# Patient Record
Sex: FEMALE | Race: BLACK OR AFRICAN AMERICAN | Employment: OTHER | URBAN - METROPOLITAN AREA
[De-identification: names, ages, dates, MRNs, and addresses within clinical notes are randomized per-mention and may not be internally consistent; named-entity substitution may affect disease eponyms.]

---

## 2020-09-19 ENCOUNTER — HOSPITAL ENCOUNTER (EMERGENCY)
Age: 65
Discharge: HOME | End: 2020-09-19
Attending: EMERGENCY MEDICINE
Payer: MEDICARE

## 2020-09-19 ENCOUNTER — APPOINTMENT (OUTPATIENT)
Dept: GENERAL RADIOLOGY | Age: 65
End: 2020-09-19
Attending: EMERGENCY MEDICINE
Payer: MEDICARE

## 2020-09-19 VITALS
TEMPERATURE: 99.4 F | DIASTOLIC BLOOD PRESSURE: 79 MMHG | RESPIRATION RATE: 17 BRPM | HEART RATE: 76 BPM | HEIGHT: 64 IN | OXYGEN SATURATION: 98 % | WEIGHT: 166.67 LBS | SYSTOLIC BLOOD PRESSURE: 143 MMHG | BODY MASS INDEX: 28.45 KG/M2

## 2020-09-19 DIAGNOSIS — M54.16 LUMBAR RADICULOPATHY: Primary | ICD-10-CM

## 2020-09-19 DIAGNOSIS — M54.31 SCIATICA OF RIGHT SIDE: ICD-10-CM

## 2020-09-19 PROCEDURE — 73502 X-RAY EXAM HIP UNI 2-3 VIEWS: CPT

## 2020-09-19 PROCEDURE — 96372 THER/PROPH/DIAG INJ SC/IM: CPT

## 2020-09-19 PROCEDURE — 99283 EMERGENCY DEPT VISIT LOW MDM: CPT

## 2020-09-19 PROCEDURE — 74011250637 HC RX REV CODE- 250/637: Performed by: EMERGENCY MEDICINE

## 2020-09-19 PROCEDURE — 74011250636 HC RX REV CODE- 250/636: Performed by: EMERGENCY MEDICINE

## 2020-09-19 RX ORDER — KETOROLAC TROMETHAMINE 30 MG/ML
15 INJECTION, SOLUTION INTRAMUSCULAR; INTRAVENOUS
Status: COMPLETED | OUTPATIENT
Start: 2020-09-19 | End: 2020-09-19

## 2020-09-19 RX ORDER — MORPHINE SULFATE 2 MG/ML
4 INJECTION, SOLUTION INTRAMUSCULAR; INTRAVENOUS ONCE
Status: COMPLETED | OUTPATIENT
Start: 2020-09-19 | End: 2020-09-19

## 2020-09-19 RX ORDER — LIDOCAINE 4 G/100G
1 PATCH TOPICAL EVERY 12 HOURS
Qty: 60 PATCH | Refills: 0 | Status: SHIPPED | OUTPATIENT
Start: 2020-09-19 | End: 2020-10-19

## 2020-09-19 RX ORDER — METHOCARBAMOL 750 MG/1
750 TABLET, FILM COATED ORAL 4 TIMES DAILY
Qty: 56 TAB | Refills: 0 | Status: SHIPPED | OUTPATIENT
Start: 2020-09-19 | End: 2020-10-03

## 2020-09-19 RX ORDER — ACETAMINOPHEN 500 MG
1000 TABLET ORAL ONCE
Status: DISCONTINUED | OUTPATIENT
Start: 2020-09-19 | End: 2020-09-19 | Stop reason: SDUPTHER

## 2020-09-19 RX ORDER — ACETAMINOPHEN 500 MG
1000 TABLET ORAL ONCE
Status: COMPLETED | OUTPATIENT
Start: 2020-09-19 | End: 2020-09-19

## 2020-09-19 RX ORDER — IBUPROFEN 400 MG/1
800 TABLET ORAL ONCE
Status: COMPLETED | OUTPATIENT
Start: 2020-09-19 | End: 2020-09-19

## 2020-09-19 RX ORDER — IBUPROFEN 400 MG/1
800 TABLET ORAL ONCE
Status: DISCONTINUED | OUTPATIENT
Start: 2020-09-19 | End: 2020-09-19 | Stop reason: SDUPTHER

## 2020-09-19 RX ORDER — DIAZEPAM 5 MG/1
5 TABLET ORAL
Status: COMPLETED | OUTPATIENT
Start: 2020-09-19 | End: 2020-09-19

## 2020-09-19 RX ADMIN — IBUPROFEN 800 MG: 400 TABLET, FILM COATED ORAL at 02:55

## 2020-09-19 RX ADMIN — KETOROLAC TROMETHAMINE 15 MG: 30 INJECTION, SOLUTION INTRAMUSCULAR at 04:26

## 2020-09-19 RX ADMIN — ACETAMINOPHEN 1000 MG: 500 TABLET ORAL at 02:55

## 2020-09-19 RX ADMIN — DIAZEPAM 5 MG: 5 TABLET ORAL at 03:16

## 2020-09-19 RX ADMIN — MORPHINE SULFATE 4 MG: 2 INJECTION, SOLUTION INTRAMUSCULAR; INTRAVENOUS at 04:26

## 2020-09-19 NOTE — ED NOTES
Chano Ambriz MD reviewed discharge instructions with the patient. The patient verbalized understanding. All questions and concerns were addressed. The patient is discharged ambulatory with instructions and prescriptions in hand. Pt is alert and oriented x 4. Respirations are clear and unlabored.

## 2020-09-19 NOTE — DISCHARGE INSTRUCTIONS
PAIN CONTROL  Tylenol 1000 mg every 6 hours  Ibuprofen 600mg every 6 hours  Robaxin 750 mg every 6 hours as needed for breakthrough pain  Lidocaine Patch 4% (Over the counter - called Salonpas)  Heat, ice, massage

## 2020-09-19 NOTE — ED PROVIDER NOTES
EMERGENCY DEPARTMENT HISTORY AND PHYSICAL EXAM      Date: 9/19/2020  Patient Name: Lucas Hendricks    History of Presenting Illness     Chief Complaint   Patient presents with    Hip Pain     Pt having right hip pain- pt states it is very stiff and painful that started today. Pt denies any injury/trauma. History Provided By: Patient    HPI: Lucas Hendricks, 72 y.o. female  With past medical history of herniated disc presenting today with right-sided hip and back pain. The patient states that she was outside doing some gardening more. She notes that she was digging holes, planting some plants, and then had sudden onset of severe right-sided hip pain. She notes that it is in the lateral hip on the right side, and she has radiation down into her posterior thigh. She denies any weakness, numbness, tingling. She states that she has had a herniated disc in the past.  She has had to do physical therapy as well. She did not fall or strike her head. She states that the pain got worse this evening and she brought herself to the hospital to be evaluated. No bowel bladder dysfunction. No other complaints at this time. There are no other complaints, changes, or physical findings at this time. PCP: Marito Ingram MD    No current facility-administered medications on file prior to encounter. Current Outpatient Medications on File Prior to Encounter   Medication Sig Dispense Refill    nitroglycerin (NITROSTAT) 0.4 mg SL tablet Take 1 Tab by mouth every five (5) minutes as needed for Chest Pain. Sit/Lay down then put one tab under the tongue every 5 minutes as needed for chest pain for 3 doses 1 Bottle 0    traMADol (ULTRAM) 50 mg tablet Take 1 Tab by mouth every six (6) hours as needed for Pain. 20 Tab 0    KELP PO Take  by mouth.  multivits,Stress Formula-Zinc tablet Take 1 Tab by mouth daily.  multivitamin (ONE A DAY) tablet Take 1 Tab by mouth daily.       biotin 2,500 mcg tab Take  by mouth.      cyanocobalamin (VITAMIN B-12) 500 mcg tablet Take 500 mcg by mouth daily. Past History     Past Medical History:  No past medical history on file. Past Surgical History:  Past Surgical History:   Procedure Laterality Date    HX GYN      hysterectomy       Family History:  No family history on file. Social History:  Social History     Tobacco Use    Smoking status: Never Smoker   Substance Use Topics    Alcohol use: No    Drug use: No       Allergies:  No Known Allergies      Review of Systems   Constitutional: No  fever  Skin: No  rash  HEENT: No  nasal congestion  Resp: No cough  CV: No chest pain  GI: No vomiting  : No dysuria  MSK: + joint pain  Neuro: No numbness  Psych: No suicidal      Physical Exam     Patient Vitals for the past 12 hrs:   Temp Pulse Resp BP SpO2   09/19/20 0212 99.4 °F (37.4 °C) 76 17 (!) 143/79 98 %       General: alert, No acute distress  Eyes: EOMI, normal conjunctiva  ENT: moist mucous membranes. Neck: Active, full ROM of neck. Skin: No rashes. no jaundice              Lungs: Equal chest expansion. no respiratory distress. Heart: regular rate     no peripheral edema    Abd:  non distended soft  Back: Full ROM, no tenderness palpation in the midline of the back. MSK: Tenderness to palpation in the lateral right hip with area of muscle spasm approximately 2 cm to centimeter. Normal range of motion in this hip. Neuro: alert  Person, Place, Time and Situation; normal speech;   Psych: Cooperative with exam; Appropriate mood and affect             Diagnostic Study Results     Labs -   No results found for this or any previous visit (from the past 12 hour(s)). Radiologic Studies -   XR HIP RT W OR WO PELV 2-3 VWS   Final Result   IMPRESSION:    No acute abnormality. CT Results  (Last 48 hours)    None        CXR Results  (Last 48 hours)    None          Medical Decision Making   I am the first provider for this patient.     I reviewed the vital signs, available nursing notes, past medical history, past surgical history, family history and social history. Vital Signs-Reviewed the patient's vital signs. Patient Vitals for the past 12 hrs:   Temp Pulse Resp BP SpO2   09/19/20 0212 99.4 °F (37.4 °C) 76 17 (!) 143/79 98 %       Pulse Oximetry Analysis - 98% on room air      Provider Notes (Medical Decision Making):     Differential Diagnosis: Herniated disc, muscle spasm, sciatica, lumbar radiculopathy, fracture    Initial Plan: We will treat the patient with oral analgesics she is Miguel using topical agents at home. Plain film, and reassess. ED Course:   Initial assessment performed. The patients presenting problems have been discussed, and they are in agreement with the care plan formulated and outlined with them. I have encouraged them to ask questions as they arise throughout their visit. ED Course as of Sep 19 0531   Sat Sep 19, 2020   0257 On my interpretation of the patient's hip x-ray there is no evidence of acute fracture. [NW]   2913 Patient presenting today with left-sided hip pain most consistent with sciatica lumbar radiculopathy. I treated her with oral analgesics and she also received IM Toradol and IM morphine. Her pain was improved after this. Will discharge with oral and topical analgesics and have her follow-up with her primary care physician.    [NW]      ED Course User Index  [NW] Leroy Guadarrama MD       I, Cammie Witt MD, am the attending of record for this patient encounter. Dispo: Discharged. The patient has been re-evaluated and is ready for discharge. Reviewed available results with patient. Counseled patient on diagnosis and care plan. Patient has expressed understanding, and all questions have been answered. Patient agrees with plan and agrees to follow up as recommended, or to return to the ED if their symptoms worsen.  Discharge instructions have been provided and explained to the patient, along with reasons to return to the ED. PLAN:  Current Discharge Medication List      START taking these medications    Details   methocarbamoL (Robaxin-750) 750 mg tablet Take 1 Tab by mouth four (4) times daily for 14 days. Qty: 56 Tab, Refills: 0      lidocaine 4 % patch 1 Patch by TransDERmal route every twelve (12) hours every twelve (12) hours for 30 days. Qty: 60 Patch, Refills: 0           2. Follow-up Information     Follow up With Specialties Details Why Contact Info    Martha Davis MD [address]  [phone]          3. Return to ED if worse       Diagnosis     Clinical Impression:   1. Lumbar radiculopathy    2. Sciatica of right side        Attestations:    Alisa Valdes MD    Please note that this dictation was completed with [employer], the computer voice recognition software. Quite often unanticipated grammatical, syntax, homophones, and other interpretive errors are inadvertently transcribed by the computer software. Please disregard these errors. Please excuse any errors that have escaped final proofreading. Thank you.

## 2023-05-11 RX ORDER — NITROGLYCERIN 0.4 MG/1
TABLET SUBLINGUAL
COMMUNITY
Start: 2014-02-02

## 2023-05-11 RX ORDER — TRAMADOL HYDROCHLORIDE 50 MG/1
TABLET ORAL EVERY 6 HOURS PRN
COMMUNITY
Start: 2014-02-02

## 2024-06-27 ENCOUNTER — HOSPITAL ENCOUNTER (OUTPATIENT)
Facility: HOSPITAL | Age: 69
Setting detail: RECURRING SERIES
Discharge: HOME | End: 2024-06-30
Payer: MEDICARE

## 2024-06-27 PROCEDURE — 97161 PT EVAL LOW COMPLEX 20 MIN: CPT

## 2024-06-27 PROCEDURE — 97110 THERAPEUTIC EXERCISES: CPT

## 2024-06-27 NOTE — THERAPY EVALUATION
[facility]  [address]  [place] [address]  Phone: [phone]   Fax: [phone]          PHYSICAL THERAPY - MEDICARE EVALUATION/PLAN OF CARE NOTE (updated 3/23)      Date: 2024          Patient Name:  Beverly Arredondo :  1955   Medical   Diagnosis:  Other low back pain [M54.59]  Radiculopathy, lumbar region [M54.16] Treatment Diagnosis:  M54.41  LUMBAGO WITH SCIATICA, RIGHT SIDE and M54.59, G89.29  CHRONIC LOWER BACK PAIN    Referral Source:  Lorenzo Connell MD Provider #:  [id number]                Insurance: Payor: MEDICARE / Plan: MEDICARE PART A AND B / Product Type: *No Product type* /      Patient  verified yes     Visit #   Current  / Total 1 24   Time   In / Out 8:17 am 9:12 am   Total Treatment Time 55 min   Total Timed Codes 23 min   1:1 Treatment Time 23 min      [facility] Totals Reminder:  bill using total billable   min of TIMED therapeutic procedures and modalities.   8-22 min = 1 unit; 23-37 min = 2 units; 38-52 min = 3 units;  53-67 min = 4 units; 68-82 min = 5 units       SUBJECTIVE  Pain Level (0-10 scale): 0/10  []constant []intermittent [x]improving []worsening []no change since onset    Any medication changes, allergies to medications, adverse drug reactions, diagnosis change, or new procedure performed?: [x] No    [] Yes (see summary sheet for update)  Medications: Verified on Patient Summary List    Subjective functional status/changes:     Patient presents to skilled physical therapy with chief complaint of stiffness and pain across lower back and bilateral hips with radiating pain down right leg. Patient reports she has had several episodes of exacerbation of pain and stiffness in the past and it commonly switches between right and left leg. Most recent exacerbation in pain was ~2 months ago when she first noted a \"twinge\" when cutting the grass. Reports after this she became very

## 2024-07-12 ENCOUNTER — APPOINTMENT (OUTPATIENT)
Facility: HOSPITAL | Age: 69
End: 2024-07-12
Payer: COMMERCIAL

## 2024-07-16 ENCOUNTER — HOSPITAL ENCOUNTER (OUTPATIENT)
Facility: HOSPITAL | Age: 69
Setting detail: RECURRING SERIES
Discharge: HOME | End: 2024-07-19
Attending: FAMILY MEDICINE
Payer: MEDICARE

## 2024-07-16 PROCEDURE — 97110 THERAPEUTIC EXERCISES: CPT

## 2024-07-16 NOTE — PROGRESS NOTES
PHYSICAL THERAPY - MEDICARE DAILY TREATMENT NOTE (updated 3/23)      Date: 2024          Patient Name:  Beverly Arredondo :  1955   Medical   Diagnosis:  Other low back pain [M54.59]  Radiculopathy, lumbar region [M54.16] Treatment Diagnosis:  M54.41  LUMBAGO WITH SCIATICA, RIGHT SIDE and M54.59, G89.29  CHRONIC LOWER BACK PAIN    Referral Source:  Lorenzo Connell MD Insurance:   Payor: MEDICARE / Plan: MEDICARE PART A AND B / Product Type: *No Product type* /                     Patient  verified yes     Visit #   Current  / Total 2 24   Time   In / Out 701 748   Total Treatment Time 47   Total Timed Codes 47   1:1 Treatment Time 47      [facility] Totals Reminder:  bill using total billable   min of TIMED therapeutic procedures and modalities.   8-22 min = 1 unit; 23-37 min = 2 units; 38-52 min = 3 units; 53-67 min = 4 units; 68-82 min = 5 units            SUBJECTIVE    Pain Level (0-10 scale): 0/10    Any medication changes, allergies to medications, adverse drug reactions, diagnosis change, or new procedure performed?: [x] No    [] Yes (see summary sheet for update)  Medications: Verified on Patient Summary List    Subjective functional status/changes:     Patient noted they have had COVID recently and were very sick so they weren't doing too many of their exercises, but have begun doing them recently. Patient continues to note stiffness but notes decreased radicular symptoms.     OBJECTIVE      Therapeutic Procedures:  Tx Min Billable or 1:1 Min (if diff from Tx Min) Procedure, Rationale, Specifics   47  [id number] Therapeutic Exercise (timed):  increase ROM, strength, coordination, balance, and proprioception to improve patient's ability to progress to PLOF and address remaining functional goals. (see flow sheet as applicable)     Details if applicable:                         47     Total Total             [x]  Patient Education billed concurrently with other procedures   [x] Review HEP    []

## 2024-07-19 ENCOUNTER — HOSPITAL ENCOUNTER (OUTPATIENT)
Facility: HOSPITAL | Age: 69
Setting detail: RECURRING SERIES
Discharge: HOME | End: 2024-07-22
Attending: FAMILY MEDICINE
Payer: COMMERCIAL

## 2024-07-19 PROCEDURE — 97110 THERAPEUTIC EXERCISES: CPT

## 2024-07-19 NOTE — PROGRESS NOTES
PHYSICAL THERAPY - MEDICARE DAILY TREATMENT NOTE (updated 3/23)      Date: 2024          Patient Name:  Beverly Arredondo :  1955   Medical   Diagnosis:  Other low back pain [M54.59]  Radiculopathy, lumbar region [M54.16] Treatment Diagnosis:  M54.41  LUMBAGO WITH SCIATICA, RIGHT SIDE and M54.59, G89.29  CHRONIC LOWER BACK PAIN    Referral Source:  Lorenzo Connell MD Insurance:   Payor: MEDICARE / Plan: MEDICARE PART A AND B / Product Type: *No Product type* /                     Patient  verified yes     Visit #   Current  / Total 3 24   Time   In / Out 7:03 am 7:59 am   Total Treatment Time 56 min   Total Timed Codes 56 min   1:1 Treatment Time 56 min      [facility] Totals Reminder:  bill using total billable   min of TIMED therapeutic procedures and modalities.   8-22 min = 1 unit; 23-37 min = 2 units; 38-52 min = 3 units; 53-67 min = 4 units; 68-82 min = 5 units        SUBJECTIVE    Pain Level (0-10 scale): 0/10    Any medication changes, allergies to medications, adverse drug reactions, diagnosis change, or new procedure performed?: [x] No    [] Yes (see summary sheet for update)  Medications: Verified on Patient Summary List    Subjective functional status/changes:     Patient reports minimal to no pain over the past several days, but does note continued stiffness across entire lower back and most specifically along L posterolateral hip. Reports she has continued to do her exercises, but has been focusing on the initial set of stretches/exercises more due to having to go outside to use the stairs.    OBJECTIVE    Therapeutic Procedures:  Tx Min Billable or 1:1 Min (if diff from Tx Min) Procedure, Rationale, Specifics   56  15844 Therapeutic Exercise (timed):  increase ROM, strength, coordination, balance, and proprioception to improve patient's ability to progress to PLOF and address remaining functional goals. (see flow sheet as applicable)     Details if applicable:                         56

## 2024-07-25 ENCOUNTER — HOSPITAL ENCOUNTER (OUTPATIENT)
Facility: HOSPITAL | Age: 69
Setting detail: RECURRING SERIES
Discharge: HOME | End: 2024-07-28
Attending: FAMILY MEDICINE
Payer: COMMERCIAL

## 2024-07-25 PROCEDURE — 97110 THERAPEUTIC EXERCISES: CPT

## 2024-07-25 NOTE — PROGRESS NOTES
life and sleep hygiene.  Patient will report no pain/stiffness when performing sit to stand transfers from standard seat height or toilet without use of UE to show improving functional mobility needed for all transitional movements.  Patient will report reduced familiar symptoms by >/= 80% allowing for improving participation in all household chores and yard work such as mowing the yard without onset of pain.         PLAN  Yes  Continue plan of care  Re-Cert Due: 9/25/2024  [x]  Upgrade activities as tolerated  []  Discharge due to:  []  Other:      Riccardo Puckett PTA       7/25/2024       7:34 AM

## 2024-08-01 ENCOUNTER — HOSPITAL ENCOUNTER (OUTPATIENT)
Facility: HOSPITAL | Age: 69
Setting detail: RECURRING SERIES
Discharge: HOME | End: 2024-08-04
Attending: FAMILY MEDICINE
Payer: MEDICARE

## 2024-08-01 PROCEDURE — 97110 THERAPEUTIC EXERCISES: CPT

## 2024-08-02 NOTE — PROGRESS NOTES
PHYSICAL THERAPY - MEDICARE DAILY TREATMENT NOTE (updated 3/23)      Date: 2024          Patient Name:  Beverly Arredondo :  1955   Medical   Diagnosis:  Other low back pain [M54.59]  Radiculopathy, lumbar region [M54.16] Treatment Diagnosis:  M54.41  LUMBAGO WITH SCIATICA, RIGHT SIDE and M54.59, G89.29  CHRONIC LOWER BACK PAIN    Referral Source:  Lorenzo Connell MD Insurance:   Payor: MEDICARE / Plan: MEDICARE PART A AND B / Product Type: *No Product type* /                     Patient  verified yes     Visit #   Current  / Total 5 24   Time   In / Out 7:04 am 7:58 am   Total Treatment Time 54 min   Total Timed Codes 54 min   1:1 Treatment Time 47 min      [facility] Totals Reminder:  bill using total billable   min of TIMED therapeutic procedures and modalities.   8-22 min = 1 unit; 23-37 min = 2 units; 38-52 min = 3 units; 53-67 min = 4 units; 68-82 min = 5 units        SUBJECTIVE    Pain Level (0-10 scale): 0/10    Any medication changes, allergies to medications, adverse drug reactions, diagnosis change, or new procedure performed?: [x] No    [] Yes (see summary sheet for update)  Medications: Verified on Patient Summary List    Subjective functional status/changes:     Patient reports she has continued to note minimal to no radicular sx down right leg, but still experiencing stiffness and pain across entire lower back. Reports her left hip has started to become more bothersome than the right. Reports continued difficulty with transitional movements such as sit to stands and especially with bending related tasks. Reports she has to really be careful and think about her body position prior to bending down to pick something up off the ground or stooping down for a task. Patient feels she has improved by 50% since starting PT.    OBJECTIVE    Therapeutic Procedures:  Tx Min Billable or 1:1 Min (if diff from Tx Min) Procedure, Rationale, Specifics   54 [phone] [id number] Therapeutic Exercise (timed):  increase 
to benefit from skilled PT / OT services to modify and progress therapeutic interventions, analyze and address functional mobility deficits, analyze and address ROM deficits, analyze and address strength deficits, analyze and address soft tissue restrictions, analyze and cue for proper movement patterns, and analyze and modify for postural abnormalities to address functional deficits and attain remaining goals.     CURRENT STATUS/GOALS:  Short Term Goals: To be accomplished in 12 treatments.  Patient will demonstrate understanding of and compliance with HEP showing full participation in physical therapy. Met   Patient will perform >/= 6 repetitions of STS without UE assist from standard seat height during 30 second STS test to show improving ease with transfers in household. Met  Patient will demonstrate pain-free thoracolumbar AROM all directions without report of pain to allow improving ease with tasks such as bending over to put on shoes or twisting to turn over in bed. Met  Patient will report reduced familiar symptoms by >/= 25% allowing for improving participation in daily tasks. Met (Subjective report: 50%)     Long Term Goals: To be accomplished in 24 treatments.  Patient will not decrease FOTO score by more than the MDC of 9 to >/= 73 showing continued high level of self-reported level of function.  Met (75/100)  Patient will report decrease stiffness and pains in the mornings getting out of bed with pain no greater than 2/10 to allow improving overall quality of life and sleep hygiene. Progressing  Patient will report no pain/stiffness when performing sit to stand transfers from standard seat height or toilet without use of UE to show improving functional mobility needed for all transitional movements. Progressing  Patient will report reduced familiar symptoms by >/= 80% allowing for improving participation in all household chores and yard work such as mowing the yard without onset of pain.

## 2024-08-06 ENCOUNTER — HOSPITAL ENCOUNTER (OUTPATIENT)
Facility: HOSPITAL | Age: 69
Setting detail: RECURRING SERIES
Discharge: HOME | End: 2024-08-09
Attending: FAMILY MEDICINE
Payer: MEDICARE

## 2024-08-06 PROCEDURE — 97110 THERAPEUTIC EXERCISES: CPT

## 2024-08-06 PROCEDURE — 97140 MANUAL THERAPY 1/> REGIONS: CPT

## 2024-08-06 NOTE — PROGRESS NOTES
to PLOF and address remaining functional goals. (see flow sheet as applicable)     Details if applicable:     12 12 51706 Manual Therapy (timed):  decrease pain, increase ROM, increase tissue extensibility, decrease edema, decrease trigger points, and increase postural awareness to improve patient's ability to progress to PLOF and address remaining functional goals.  The manual therapy interventions were performed at a separate and distinct time from the therapeutic activities interventions . (see flow sheet as applicable)    Details if applicable:   right sidelying: STM/TPR L posterolateral hip (glute min/med/max/piriformis/QL); gentle sidelying lumbar/SI gapping technique; L hip extension PROM with gentle PA joint mob                  54 54    Total Total       [x]  Patient Education billed concurrently with other procedures   [x] Review HEP    [] Progressed/Changed HEP, detail:    [] Other detail:         Other Objective/Functional Measures    Pain Level at end of session (0-10 scale): 6/10      Assessment   Patient tolerated treatment session fair today, but note significant TTP along L posterolateral hip and moderate flexibility restriction through L hip flexors during manual therapy. Recommended patient increase frequency of performing stretches after period of inactivity due to report of continued stiffness at night and first thing in the mornings. Patient verbalized understanding and will initiate new plan at home to improve sx management. Continue to progress as appropriate. Patient will continue to benefit from skilled PT / OT services to modify and progress therapeutic interventions, analyze and address functional mobility deficits, analyze and address ROM deficits, analyze and address strength deficits, analyze and address soft tissue restrictions, analyze and cue for proper movement patterns, and analyze and modify for postural abnormalities to address functional deficits and attain remaining

## 2024-08-09 ENCOUNTER — APPOINTMENT (OUTPATIENT)
Facility: HOSPITAL | Age: 69
End: 2024-08-09
Payer: MEDICARE

## 2024-08-13 ENCOUNTER — HOSPITAL ENCOUNTER (OUTPATIENT)
Facility: HOSPITAL | Age: 69
Setting detail: RECURRING SERIES
Discharge: HOME | End: 2024-08-16
Attending: FAMILY MEDICINE
Payer: MEDICARE

## 2024-08-13 PROCEDURE — 97110 THERAPEUTIC EXERCISES: CPT

## 2024-08-13 NOTE — PROGRESS NOTES
PHYSICAL THERAPY - MEDICARE DAILY TREATMENT NOTE (updated 3/23)    Date: 2024          Patient Name:  Beverly Arredondo :  1955   Medical   Diagnosis:  Other low back pain [M54.59]  Radiculopathy, lumbar region [M54.16] Treatment Diagnosis:  M54.41  LUMBAGO WITH SCIATICA, RIGHT SIDE and M54.59, G89.29  CHRONIC LOWER BACK PAIN    Referral Source:  Lorenzo Connell MD Insurance:   Payor: MEDICARE / Plan: MEDICARE PART A AND B / Product Type: *No Product type* /                     Patient  verified yes     Visit #   Current  / Total 7 24   Time   In / Out 7:03 am 7:51 am   Total Treatment Time 48 min   Total Timed Codes 48  min   1:1 Treatment Time 38  min      [facility] Totals Reminder:  bill using total billable   min of TIMED therapeutic procedures and modalities.   8-22 min = 1 unit; 23-37 min = 2 units; 38-52 min = 3 units; 53-67 min = 4 units; 68-82 min = 5 units        SUBJECTIVE    Pain Level (0-10 scale): 0/10    Any medication changes, allergies to medications, adverse drug reactions, diagnosis change, or new procedure performed?: [x] No    [] Yes (see summary sheet for update)  Medications: Verified on Patient Summary List    Subjective functional status/changes:     Patient reports she has been feeling better across her entire lower back, noting she took some pain medicine following recent finger surgery which helped but hast taken any in a couple days and still feeling relief in pain. Patient notes she has been walking ~30-45 minutes at least 4 days/week without any pain.     OBJECTIVE    Therapeutic Procedures:  Tx Min Billable or 1:1 Min (if diff from Tx Min) Procedure, Rationale, Specifics   [id number] [phone] [id number] Therapeutic Exercise (timed):  increase ROM, strength, coordination, balance, and proprioception to improve patient's ability to progress to PLOF and address remaining functional goals. (see flow sheet as applicable)     Details if applicable:     [place] nt [address]49 Manual Therapy (timed):  decrease

## 2024-08-16 ENCOUNTER — HOSPITAL ENCOUNTER (OUTPATIENT)
Facility: HOSPITAL | Age: 69
Setting detail: RECURRING SERIES
Discharge: HOME | End: 2024-08-19
Attending: FAMILY MEDICINE
Payer: MEDICARE

## 2024-08-16 PROCEDURE — 97110 THERAPEUTIC EXERCISES: CPT

## 2024-08-16 NOTE — PROGRESS NOTES
during 30 second STS test to show improving ease with transfers in household. Met  Patient will demonstrate pain-free thoracolumbar AROM all directions without report of pain to allow improving ease with tasks such as bending over to put on shoes or twisting to turn over in bed. Met  Patient will report reduced familiar symptoms by >/= 25% allowing for improving participation in daily tasks. Met (Subjective report: 50%)     Long Term Goals: To be accomplished in 24 treatments.  Patient will not decrease FOTO score by more than the MDC of 9 to >/= 73 showing continued high level of self-reported level of function.  Met (75/100)  Patient will report decrease stiffness and pains in the mornings getting out of bed with pain no greater than 2/10 to allow improving overall quality of life and sleep hygiene. Progressing  Patient will report no pain/stiffness when performing sit to stand transfers from standard seat height or toilet without use of UE to show improving functional mobility needed for all transitional movements. Progressing  Patient will report reduced familiar symptoms by >/= 80% allowing for improving participation in all household chores and yard work such as mowing the yard without onset of pain. Progressing     PLAN  Yes  Continue plan of care  Re-Cert Due: 9/25/2024  [x]  Upgrade activities as tolerated  []  Discharge due to:  []  Other:      Alaina Tellez, PT       8/16/2024       7:42 AM

## 2024-08-20 ENCOUNTER — HOSPITAL ENCOUNTER (OUTPATIENT)
Facility: HOSPITAL | Age: 69
Setting detail: RECURRING SERIES
Discharge: HOME | End: 2024-08-23
Attending: FAMILY MEDICINE
Payer: MEDICARE

## 2024-08-20 PROCEDURE — 97110 THERAPEUTIC EXERCISES: CPT

## 2024-08-20 NOTE — PROGRESS NOTES
PHYSICAL THERAPY - MEDICARE DAILY TREATMENT NOTE (updated 3/23)    Date: 2024          Patient Name:  Beverly Arredondo :  1955   Medical   Diagnosis:  Other low back pain [M54.59]  Radiculopathy, lumbar region [M54.16] Treatment Diagnosis:  M54.41  LUMBAGO WITH SCIATICA, RIGHT SIDE and M54.59, G89.29  CHRONIC LOWER BACK PAIN    Referral Source:  Lorenzo Connell MD Insurance:   Payor: MEDICARE / Plan: MEDICARE PART A AND B / Product Type: *No Product type* /                     Patient  verified yes     Visit #   Current  / Total 9 24   Time   In / Out 7:02 am 7:50 am   Total Treatment Time 48 min   Total Timed Codes 48  min   1:1 Treatment Time 40  min      [facility] Totals Reminder:  bill using total billable   min of TIMED therapeutic procedures and modalities.   8-22 min = 1 unit; 23-37 min = 2 units; 38-52 min = 3 units; 53-67 min = 4 units; 68-82 min = 5 units        SUBJECTIVE    Pain Level (0-10 scale): 0/10    Any medication changes, allergies to medications, adverse drug reactions, diagnosis change, or new procedure performed?: [x] No    [] Yes (see summary sheet for update)  Medications: Verified on Patient Summary List    Subjective functional status/changes:     Patient reports her pain has continued to improve and is much better. Reports ability to bend over, stand up from sitting or supine, and stiffness in the mornings have all improved.    OBJECTIVE    Therapeutic Procedures:  Tx Min Billable or 1:1 Min (if diff from Tx Min) Procedure, Rationale, Specifics   [id number] [phone] [id number] Therapeutic Exercise (timed):  increase ROM, strength, coordination, balance, and proprioception to improve patient's ability to progress to PLOF and address remaining functional goals. (see flow sheet as applicable)     Details if applicable:     [place] nt [id number] Manual Therapy (timed):  decrease pain, increase ROM, increase tissue extensibility, decrease edema, decrease trigger points, and increase postural awareness to

## 2024-08-23 ENCOUNTER — APPOINTMENT (OUTPATIENT)
Facility: HOSPITAL | Age: 69
End: 2024-08-23
Payer: MEDICARE

## 2024-08-27 ENCOUNTER — APPOINTMENT (OUTPATIENT)
Facility: HOSPITAL | Age: 69
End: 2024-08-27
Payer: MEDICARE

## 2024-08-30 ENCOUNTER — HOSPITAL ENCOUNTER (OUTPATIENT)
Facility: HOSPITAL | Age: 69
Setting detail: RECURRING SERIES
End: 2024-08-30
Attending: FAMILY MEDICINE
Payer: MEDICARE

## 2024-08-30 PROCEDURE — 97110 THERAPEUTIC EXERCISES: CPT

## 2024-08-30 NOTE — PROGRESS NOTES
PHYSICAL THERAPY - MEDICARE DAILY TREATMENT NOTE (updated 3/23)    Date: 2024          Patient Name:  Beverly Arredondo :  1955   Medical   Diagnosis:  Other low back pain [M54.59]  Radiculopathy, lumbar region [M54.16] Treatment Diagnosis:  M54.41  LUMBAGO WITH SCIATICA, RIGHT SIDE and M54.59, G89.29  CHRONIC LOWER BACK PAIN    Referral Source:  Lorenzo Connell MD Insurance:   Payor: MEDICARE / Plan: MEDICARE PART A AND B / Product Type: *No Product type* /                     Patient  verified yes     Visit #   Current  / Total 10 24   Time   In / Out 7:07 am 7:34 am   Total Treatment Time 27 min   Total Timed Codes 27 min   1:1 Treatment Time 27 min      [facility] Totals Reminder:  bill using total billable   min of TIMED therapeutic procedures and modalities.   8-22 min = 1 unit; 23-37 min = 2 units; 38-52 min = 3 units; 53-67 min = 4 units; 68-82 min = 5 units        SUBJECTIVE    Pain Level (0-10 scale): 0/10    Any medication changes, allergies to medications, adverse drug reactions, diagnosis change, or new procedure performed?: [x] No    [] Yes (see summary sheet for update)  Medications: Verified on Patient Summary List    Subjective functional status/changes:     Patient reports she has been experiencing minimal to no back pain over the past several weeks and is very happy with her current progress. Patient has subjective report of 85% improvement in all pain and overall functional mobility since starting PT. Patient reports confidence in ability to maintain current progress independently via HEP.    OBJECTIVE    Therapeutic Procedures:  Tx Min Billable or 1:1 Min (if diff from Tx Min) Procedure, Rationale, Specifics   [phone] [id number] Therapeutic Exercise (timed):  increase ROM, strength, coordination, balance, and proprioception to improve patient's ability to progress to PLOF and address remaining functional goals. (see flow sheet as applicable)     Details if applicable:     [place] nt [id number] Manual                 5 (4+)            5 (4)  2 - Poor                                   Hip Adduction                          nt                     nt  3 - Fair                                     Hip Extension                          nt                     nt                                   4 - Good                                  Knee flexion                            5                      5  5 - Normal                               Knee extension                       5                      5                                                  Ankle Dorsiflexion                   5                      5                                                  Ankle Plantarflexion                5                      5                                                  Great toe extension                nt                     nt    Pain Level at end of session (0-10 scale): 0/10    Assessment   Patient is 69 year old female who has been seen for 10 visits since initial evaluation on  06/27/2024  due to chronic low back pain with radiating pain to R buttock and down right lower extremity. Patient has responded very nicely to conservative treatment and has made consistent progress in therapy towards all short term and long term goals. Patient has currently met all short term goals and 3/4 long term goals to date. Patient demonstrates improving lower extremity and core strength, pain-free thoracolumbar range of motion, muscle flexibility, functional mobility, and tolerance to activity. Patient has subjective report of improving tolerance to all bending and lifting related household chores that she previously was unable to complete secondary to pain.. Patient has subjective report of 85% reduction in familiar symptoms since initial evaluation. Patient reports confidence in ability to maintain current progress through HEP. Provided patient with handout of HEP including comprehensive list of progressive resistive exercises to

## 2024-08-30 NOTE — PROGRESS NOTES
[facility]  [address]  [place] [address]  Phone: [phone]   Fax: [phone]     DISCHARGE SUMMARY  Patient Name: Beverly Arredondo : 1955   Treatment/Medical Diagnosis: Other low back pain [M54.59]  Radiculopathy, lumbar region [M54.16]   Referral Source: Lorenzo Connell MD     Date of Initial Visit: 2024 Attended Visits: 10 Missed Visits: 0     SUMMARY OF TREATMENT  Patient is 69 year old female who has been seen for 10 visits since initial evaluation on  2024  due to chronic low back pain with radiating pain to R buttock and down right lower extremity. Patient has responded very nicely to conservative treatment and has made consistent progress in therapy towards all short term and long term goals. Patient has currently met all short term goals and 3/4 long term goals to date. Patient demonstrates improving lower extremity and core strength, pain-free thoracolumbar range of motion, muscle flexibility, functional mobility, and tolerance to activity. Patient has subjective report of improving tolerance to all bending and lifting related household chores that she previously was unable to complete secondary to pain.. Patient has subjective report of 85% reduction in familiar symptoms since initial evaluation. Patient reports confidence in ability to maintain current progress through HEP. Provided patient with handout of HEP including comprehensive list of progressive resistive exercises to continue with strengthening at home. Patient verbalized understanding and demonstrates proper technique of all exercises this session. All patient questions have been answered to date. Patient will be discharged from skilled physical therapy this date, 2024 , to [facility] due to progress towards all goals.        CURRENT STATUS  Short Term Goals: To be accomplished in 12 treatments.  Patient will demonstrate